# Patient Record
Sex: MALE | Race: WHITE
[De-identification: names, ages, dates, MRNs, and addresses within clinical notes are randomized per-mention and may not be internally consistent; named-entity substitution may affect disease eponyms.]

---

## 2018-09-13 ENCOUNTER — HOSPITAL ENCOUNTER (OUTPATIENT)
Age: 73
End: 2018-09-13
Payer: COMMERCIAL

## 2018-09-13 DIAGNOSIS — Z01.812: ICD-10-CM

## 2018-09-13 DIAGNOSIS — Z01.818: ICD-10-CM

## 2018-09-13 DIAGNOSIS — I10: ICD-10-CM

## 2018-09-13 DIAGNOSIS — M16.11: Primary | ICD-10-CM

## 2018-09-13 LAB
ADD MANUAL DIFF / SLIDE REVIEW: NO
CHLORIDE SERPL-SCNC: 102 MMOL/L (ref 98–107)
CO2 SERPL-SCNC: 32 MMOL/L (ref 22–32)
HEMATOCRIT: 43.9 % (ref 41–53)
HEMOGLOBIN: 15.1 G/DL (ref 13.5–17.5)
HEMOLYSIS: < 15 (ref 0–50)
MCV RBC: 89.9 FL (ref 80–100)
MEAN CORPUSCULAR HEMOGLOBIN: 30.9 PG (ref 26–34)
MEAN CORPUSCULAR HGB CONC: 34.4 % (ref 30–36)
PLATELET COUNT: 291 X10^3/UL (ref 150–400)
POTASSIUM SERPL-SCNC: 4.9 MMOL/L (ref 3.4–5.1)
SODIUM SERPL-SCNC: 143 MMOL/L (ref 137–145)

## 2018-09-13 PROCEDURE — 93005 ELECTROCARDIOGRAM TRACING: CPT

## 2018-09-13 PROCEDURE — 36415 COLL VENOUS BLD VENIPUNCTURE: CPT

## 2018-09-13 PROCEDURE — 80051 ELECTROLYTE PANEL: CPT

## 2018-09-13 PROCEDURE — 85025 COMPLETE CBC W/AUTO DIFF WBC: CPT

## 2018-09-25 ENCOUNTER — HOSPITAL ENCOUNTER (OUTPATIENT)
Age: 73
End: 2018-09-25
Payer: COMMERCIAL

## 2018-09-25 DIAGNOSIS — M18.12: ICD-10-CM

## 2018-09-25 DIAGNOSIS — M25.532: ICD-10-CM

## 2018-09-25 DIAGNOSIS — G89.29: ICD-10-CM

## 2018-09-25 DIAGNOSIS — Z87.81: ICD-10-CM

## 2018-09-25 DIAGNOSIS — M25.512: Primary | ICD-10-CM

## 2018-09-25 DIAGNOSIS — M25.511: ICD-10-CM

## 2018-09-25 DIAGNOSIS — M19.011: ICD-10-CM

## 2018-09-25 PROCEDURE — 93306 TTE W/DOPPLER COMPLETE: CPT

## 2018-09-25 PROCEDURE — 73030 X-RAY EXAM OF SHOULDER: CPT

## 2018-09-25 PROCEDURE — 73110 X-RAY EXAM OF WRIST: CPT

## 2018-09-25 NOTE — DI.RAD.S_ITS
PROCEDURE:  XR SHOULDER LT MIN 2V  
   
INDICATIONS:  CHRONIC LEFT SHOULDER PAIN  
   
TECHNIQUE: 3 views of the shoulder were acquired.    
   
COMPARISON:  None.  
   
FINDINGS:    
   
Bones:  No fractures or dislocations.  No suspicious bony lesions.  Visualized ribs   
appear intact.  There is a mild high riding appearance of the humeral head. Mild   
degenerative glenohumeral narrowing. Mild acromioclavicular degenerative narrowing.  
   
Soft tissues:  No suspicious soft tissue calcifications.    
   
IMPRESSION:  High riding appearance of the humeral head, which can be indicative of   
rotator cuff pathology.  
   
   
   
   
Dictated by: Tika Harrington M.D. on 9/25/2018 at 14:09       
Approved by: Tika Harrington M.D. on 9/25/2018 at 14:10

## 2018-09-25 NOTE — DI.ECHO.S_ITS
"                                    [facility]  
+---------+                        [facility]                        +---------+  
:         :                      [address]                     :         :  
:         :                      [address]                     :         :  
:         :                          [id number]                         :         :  
:         :                       Phone: [phone]                      :         :  
+---------+                        [phone]                        +---------+  
                             Echocardiogram Report  
+-----------------------------------------------------------------------------+  
:Name: KEN CRUZ          Study Date: 2018        Height: 69 in  :  
:[facility] MRN #: [id number]      Exam Location: [facility]            Weight: 197 lb :  
:Account #: [id number]             Gender: Male                  BSA: 2.1 m2    :  
:: 1945                 Age: 73 yrs                   BP: 138/80 mmHg:  
:Reason For Study: New onset atrial fibrillation                              :  
:                                Performed By: Crystal Langford                    :  
+-----------------------------------------------------------------------------+  
Interpretation Summary  
The left ventricle is normal in size.  
The ejection fraction is estimated to be 60-65%.  
   
The right ventricle is mildly dilated.  
The right ventricular systolic function is normal.  
   
There is mild tricuspid regurgitation.  
The right ventricular systolic pressure is estimated at 30 mmHg assuming a  
right atrial pressure of 3 mm Hg.  
   
The ascending aorta is mild-moderately enlarged.  
   
   
Procedure:   A two-dimensional transthoracic echocardiogram with color flow  
and Doppler was performed. The study quality was technically adequate. There  
is no prior echocardiogram noted for this patient. The patient was in normal  
sinus rhythm during the exam.  
Left Ventricle:   The left ventricle is normal in size. There is normal left  
ventricular wall thickness. A false chord is noted (normal variant). The  
ejection fraction is estimated to be 60-65%. There are no focal wall motion  
abnormalities. Assessment of diastolic parameters indicates a relaxation  
abnormality of the left ventricle, consistent with normal filling pressures.  
Right Ventricle:   The right ventricle is mildly dilated. The right  
ventricular systolic function is normal.  
Atria:   The left atrium is borderline dilated. Right atrial size is normal.  
The interatrial septum is intact with no evidence for an atrial septal defect.  
   
Mitral Valve:   There is mild mitral annular calcification. There is trace  
mitral regurgitation.  
Aortic Valve:   The aortic valve is trileaflet. The aortic valve opens well.  
There is no aortic valve stenosis. No aortic regurgitation is present.  
Tricuspid Valve:   The tricuspid valve is normal in structure and function.  
There is mild tricuspid regurgitation. The right ventricular systolic pressure  
is estimated at 30 mmHg assuming a right atrial pressure of 3 mm Hg.  
Pulmonic Valve:   The pulmonic valve is not well seen, but is grossly normal.  
There is a trace or physiologic amount of pulmonic regurgitation.  
Great Vessels:   The aortic root is normal size. The ascending aorta is mild-  
moderately enlarged. The IVC is of normal diameter and collapses greater than  
50% with a sniff. This suggests a low right atrial pressure of 3 mm Hg.  
Pericardium/ Pleura   There is no pericardial effusion. There is no pleural  
effusion.  
   
   
MMode/2D Measurements & Calculations  
LVIDd: 5.1 cm                            LVOT diam: 2.5 cm  
LVIDs: 3.0 cm                            Ao root diam: 3.7 cm  
FS: 41.7 %                               asc Aorta Diam: 4.1 cm  
EPSS: 0.80 cm                            Ao Arch Diam (Prox Trans): 3.6 cm  
IVSd: 1.00 cm  
LVPWd: 1.0 cm  
LV busby. diameter/BSA (
[id number]|2018 08:35:00|2018 14:12:00|DI.RAD.S_ITS|[facility]|Imaging|[id number]|"PROCEDURE:  XR SHOULDER RT MIN 2V

## 2018-09-25 NOTE — DI.RAD.S_ITS
PROCEDURE:  XR WRIST LT MIN 3V  
   
INDICATIONS: LEFT WRIST PAIN SINCE APRIL AFTER HELPING SOMEONE MOVE  
   
TECHNIQUE:  4 views of the wrist were acquired.    
   
COMPARISON:  None.  
   
FINDINGS:    
   
Bones:  No fractures or dislocations.  No suspicious bony lesions.  There is moderate   
first CMC degenerative narrowing. Periventricular osteophytes are present.  
   
Scaphoid view: No visualized fracture.  
   
Soft tissues:  No suspicious soft tissue calcifications.    
   
IMPRESSION: Moderate first CMC degenerative narrowing.  
   
   
   
Dictated by: Tika Harrington M.D. on 9/25/2018 at 14:10       
Approved by: Tika Harrington M.D. on 9/25/2018 at 14:11

## 2018-10-20 ENCOUNTER — HOSPITAL ENCOUNTER (OUTPATIENT)
Age: 73
End: 2018-10-20
Payer: COMMERCIAL

## 2018-10-20 DIAGNOSIS — I48.0: Primary | ICD-10-CM

## 2018-10-20 LAB
ADD MANUAL DIFF / SLIDE REVIEW: NO
BUN SERPL-MCNC: 17 MG/DL (ref 9–20)
CALCIUM SERPL-MCNC: 9.9 MG/DL (ref 8.4–10.2)
CHLORIDE SERPL-SCNC: 101 MMOL/L (ref 98–107)
CO2 SERPL-SCNC: 32 MMOL/L (ref 22–32)
ESTIMATED GLOMERULAR FILT RATE: > 60 ML/MIN (ref 60–?)
GLUCOSE SERPL-MCNC: 106 MG/DL (ref 80–110)
HEMATOCRIT: 41.9 % (ref 41–53)
HEMOGLOBIN: 14.2 G/DL (ref 13.5–17.5)
HEMOLYSIS: < 15 (ref 0–50)
MCV RBC: 89.2 FL (ref 80–100)
MEAN CORPUSCULAR HEMOGLOBIN: 30.2 PG (ref 26–34)
MEAN CORPUSCULAR HGB CONC: 33.8 % (ref 30–36)
PLATELET COUNT: 307 X10^3/UL (ref 150–400)
POTASSIUM SERPL-SCNC: 4.7 MMOL/L (ref 3.4–5.1)
SODIUM SERPL-SCNC: 144 MMOL/L (ref 137–145)
TSH SERPL DL<=0.05 MIU/L-ACNC: 1.87 UIU/ML (ref 0.47–4.68)

## 2018-10-20 PROCEDURE — 84443 ASSAY THYROID STIM HORMONE: CPT

## 2018-10-20 PROCEDURE — 36415 COLL VENOUS BLD VENIPUNCTURE: CPT

## 2018-10-20 PROCEDURE — 85025 COMPLETE CBC W/AUTO DIFF WBC: CPT

## 2018-10-20 PROCEDURE — 80048 BASIC METABOLIC PNL TOTAL CA: CPT

## 2019-06-17 ENCOUNTER — HOSPITAL ENCOUNTER (OUTPATIENT)
Age: 74
End: 2019-06-17
Payer: COMMERCIAL

## 2019-06-17 DIAGNOSIS — Z01.818: Primary | ICD-10-CM

## 2019-06-17 DIAGNOSIS — I48.0: ICD-10-CM

## 2019-06-17 LAB
ADD MANUAL DIFF / SLIDE REVIEW: NO
CHOLEST SERPL-MCNC: 182 MG/DL (ref 140–199)
HDLC SERPL-MCNC: 65 MG/DL (ref 40–60)
HEMATOCRIT: 40.6 % (ref 41–53)
HEMOGLOBIN: 13.6 G/DL (ref 13.5–17.5)
LYMPHOCYTES # SPEC AUTO: 1500 /UL (ref 1100–4500)
MCV RBC: 89.1 FL (ref 80–100)
MEAN CORPUSCULAR HEMOGLOBIN: 29.9 PG (ref 26–34)
MEAN CORPUSCULAR HGB CONC: 33.5 % (ref 30–36)
PLATELET COUNT: 299 X10^3/UL (ref 150–400)
TRIGL SERPL-MCNC: 106 MG/DL (ref 35–150)

## 2019-06-17 PROCEDURE — 36415 COLL VENOUS BLD VENIPUNCTURE: CPT

## 2019-06-17 PROCEDURE — 85025 COMPLETE CBC W/AUTO DIFF WBC: CPT

## 2019-06-17 PROCEDURE — 80061 LIPID PANEL: CPT

## 2019-08-01 ENCOUNTER — HOSPITAL ENCOUNTER (OUTPATIENT)
Age: 74
End: 2019-08-01
Payer: COMMERCIAL

## 2019-08-01 DIAGNOSIS — I47.2: ICD-10-CM

## 2019-08-01 DIAGNOSIS — I48.0: Primary | ICD-10-CM

## 2019-08-01 PROCEDURE — 93016 CV STRESS TEST SUPVJ ONLY: CPT

## 2019-08-01 PROCEDURE — 93017 CV STRESS TEST TRACING ONLY: CPT

## 2019-08-01 PROCEDURE — A9502 TC99M TETROFOSMIN: HCPCS

## 2019-08-01 PROCEDURE — 93018 CV STRESS TEST I&R ONLY: CPT

## 2019-08-01 PROCEDURE — 78452 HT MUSCLE IMAGE SPECT MULT: CPT

## 2019-08-02 ENCOUNTER — HOSPITAL ENCOUNTER (OUTPATIENT)
Age: 74
End: 2019-08-02
Payer: COMMERCIAL

## 2019-08-02 DIAGNOSIS — I47.2: ICD-10-CM

## 2019-08-02 DIAGNOSIS — I77.89: ICD-10-CM

## 2019-08-02 DIAGNOSIS — I48.91: ICD-10-CM

## 2019-08-02 DIAGNOSIS — I07.1: Primary | ICD-10-CM

## 2019-08-02 PROCEDURE — 93306 TTE W/DOPPLER COMPLETE: CPT

## 2019-08-02 NOTE — P.PCN_ITS
Cardiac Stress Test Report    
Referral & Results    
Date Patient Seen: 08/02/19    
Requesting provider: Magda Perry    
Indication: AFib    
Rest ECG: Unremarkable    
Procedure Note: Today following both written and verbal informed consent the   
patient was exercised according to a standard Sergio protocol patient went for a   
total of 5 minutes 55 seconds achieving a maximum heart rate of 155 maximum   
systolic blood pressure of 198.  This is approximately 7.0 METS.  Exercise was   
terminated at this point because of targets were met.    
    
Patient was also given Cardiolite through a previously started Hep-Lock IV by   
the diagnostic imaging staff approximately 1 minute prior to the cessation of   
exercise.    
    
No ST-T segment changes    
    
Occasional PVC later in the exercise portion of the test.    
    
Normal heart rate and blood pressure response to exercise    
    
Functional aerobic impairment rates about 8% on the sedentary scale    
    
    
    
Impression:     
No ECG evidence of ischemia    
    
Occasional PVC during exercise    
    
Please see perfusion imaging report as well    
Please note: Actual ECG tracings can be found in the PACS system.

## 2019-08-02 NOTE — DI.ECHO.S_ITS
"                                    [facility]  
+---------+                        [facility]                        +---------+  
:         :                      [address]                     :         :  
:         :                      [address]                     :         :  
:         :                          [id number]                         :         :  
:         :                       Phone: [phone]                      :         :  
+---------+                        [phone]                        +---------+  
                             Echocardiogram Report  
+-----------------------------------------------------------------------------+  
:Name: KEN CRUZ           Study Date: 2019       Height: 69 in  :  
:[facility] MRN #: [id number]       Exam Location: [facility]           Weight: 198 lb :  
:Account #: [id number]            Gender: Male                 BSA: 2.1 m2    :  
:: 1945                  Age: 74 yrs                  BP: 128/68 mmHg:  
:Reason For Study: VTACH                                                      :  
:                                 Performed By: Rigoberto Serna                      :  
:Referring: SCOTT ORTEGA                                                    :  
+-----------------------------------------------------------------------------+  
Interpretation Summary  
1) Normal left ventricular size, thickness, wall motion, and systolic function  
(EF 55-60%).  
2) Normal right ventricular size and function.  
3) Moderate biatrial enlargement present.  
4) The ascending aorta is mildly enlarged at 4.1cm.  
5) Compared to the Echo done 2018, no significant change.  
   
   
Procedure:   A two-dimensional transthoracic echocardiogram with color flow  
and Doppler was performed. The study quality was technically adequate.  
Comparison is made with the echocardiogram of 18. The patient was in  
normal sinus rhythm during the exam. The patient had occasional PVCs during  
the exam.  
Left Ventricle:   The left ventricle is normal in size. There is normal left  
ventricular wall thickness. The ejection fraction is estimated to be 55-60%.  
Left ventricular systolic function is normal. There are no focal wall motion  
abnormalities.  
Right Ventricle:   The right ventricle is normal in size and function.  
Atria:   The left atrium is moderately dilated. The right atrium is moderately  
dilated. The interatrial septum is intact with no evidence for an atrial  
septal defect.  
Mitral Valve:   The mitral valve is normal in structure and function. There is  
trace mitral regurgitation.  
Aortic Valve:   The aortic valve is trileaflet. The aortic valve opens well.  
There is trace aortic regurgitation.  
Tricuspid Valve:   The tricuspid valve is normal in structure and function.  
There is mild tricuspid regurgitation. The right ventricular systolic pressure  
is estimated to be at least 25 mmHg based on an estimated right atrial  
pressure of 3 mm Hg.  
Pulmonic Valve:   The pulmonic valve is normal in structure and function.  
There is trace pulmonic regurgitation.  
Great Vessels:   The aortic root is normal size. The ascending aorta is mildly  
enlarged. The pulmonary artery is normal size. The IVC is of normal diameter  
and collapses greater than 50% with a sniff. This suggests a low right atrial  
pressure of 3 mm Hg.  
Pericardium/ Pleura   There is no pericardial effusion. There is no pleural  
effusion.  
   
   
MMode/2D Measurements & Calculations  
LVIDd: 5.3 cm                            LVOT diam: 2.3 cm  
LVIDs: 4.1 cm                            Ao root diam: 3.5 cm  
FS: 23.0 %                               Aortic Jxn: 2.7 cm  
EPSS: 0.80 cm                            asc Aorta Diam: 4.1 cm  
IVSd: 0.82 cm                            Ao Arch Diam (Prox Trans): 2.9 cm  
LVPWd: 0.90 cm  
LV busby. diameter/BSA (cm/m^2): 2.6  
LV sys. diameter/BSA (cm/m^2): 2.0  
        ______________________
[id number]|2019 10:24:03|2019 10:24:03|PM.TREADMILL||||"Cardiac Stress Test Report

## 2019-08-03 NOTE — DI.NM.S_ITS
DATE OF SERVICE: 08/01/2019  
   
PROCEDURE: Exercise perfusion study.  
   
INDICATIONS: Paroxysmal atrial fibrillation, nonsustained ventricular  
tachycardia.  
   
RADIOPHARMACEUTICAL: 25.0 mCi technetium-99m Myoview IV was injected at stress  
and 26.5 mCi technetium-99m Myoview IV was injected at rest.  
   
CARDIAC STRESS: The patient underwent an exercise stress test under the  
supervision of an attending staff.  He walked on Sergio protocol for 5 minutes 55  
seconds, achieved 7 METs of workload, 106% of target heartrate, and normal blood  
pressure response.  No significant symptoms were reported.  Baseline EKG  
revealed sinus rhythm.  Stress EKG did not reveal any obvious inducible ischemic  
changes.  There were occasional PVCs.  No obvious atrial fibrillation or  
ventricular tachycardia seen.  
   
RAW DATA: There was increased subdiaphragmatic activity.  
   
GATED STUDY: Stress LV ejection fraction 56% without any obvious wall motion  
abnormalities.  Resting end-diastolic volume 143 mL.  No transient ischemic  
dilatation.  TID ratio 0.96, which is within normal limits.  Lung/heart ratio  
0.33, which is within normal limits.  
   
MYOCARDIAL PERFUSION: Stress supine and resting supine images revealed moderate-  
sized mildly decreased perfusion of the inferior wall, inferior apex, which got  
significantly improved during prone images, suggestive of diaphragmatic tissue  
attenuation artifact.  No convincing ischemia, infarction pattern.  
   
CONCLUSION: I will call this study likely a normal myocardial perfusion study  
with evidence of diaphragmatic tissue attenuation artifact which got improved  
during prone images.  Stress LV ejection fraction was about 56%.  No obvious  
atrial fibrillation or ventricular tachycardia during exercise.  Overall, this  
is a low-risk myocardial perfusion scan.  
   
   
   
Asher Jack - MRN: [id number]  
JANET/k[employer]/ts  
doc#: [id number]/job#:  [id number]  
dd: 08/02/2019 16:33:00 dt: 08/03/2019 06:44:00  
DICTATING MD/COPIES TO: Ricky Flores MD  
COPIES MNE: ELIZABETH

## 2020-07-03 ENCOUNTER — HOSPITAL ENCOUNTER (OUTPATIENT)
Age: 75
End: 2020-07-03
Payer: COMMERCIAL

## 2020-07-03 DIAGNOSIS — Z79.01: ICD-10-CM

## 2020-07-03 DIAGNOSIS — I48.0: Primary | ICD-10-CM

## 2020-07-03 LAB
ADD MANUAL DIFF / SLIDE REVIEW: NO
BUN SERPL-MCNC: 19 MG/DL (ref 9–20)
CALCIUM SERPL-MCNC: 9.8 MG/DL (ref 8.4–10.2)
CHLORIDE SERPL-SCNC: 101 MMOL/L (ref 98–107)
CO2 SERPL-SCNC: 28 MMOL/L (ref 22–32)
ESTIMATED GLOMERULAR FILT RATE: > 60 ML/MIN (ref 60–?)
GLUCOSE SERPL-MCNC: 110 MG/DL (ref 80–110)
HEMATOCRIT: 40.7 % (ref 41–53)
HEMOGLOBIN: 14.1 G/DL (ref 13.5–17.5)
HEMOLYSIS: < 15 (ref 0–50)
LYMPHOCYTES # SPEC AUTO: 1600 /UL (ref 1100–4500)
MCV RBC: 91.8 FL (ref 80–100)
MEAN CORPUSCULAR HEMOGLOBIN: 31.7 PG (ref 26–34)
MEAN CORPUSCULAR HGB CONC: 34.6 % (ref 30–36)
PLATELET COUNT: 267 X10^3/UL (ref 150–400)
POTASSIUM SERPL-SCNC: 4.4 MMOL/L (ref 3.4–5.1)
SODIUM SERPL-SCNC: 135 MMOL/L (ref 137–145)

## 2020-07-03 PROCEDURE — 36415 COLL VENOUS BLD VENIPUNCTURE: CPT

## 2020-07-03 PROCEDURE — 85025 COMPLETE CBC W/AUTO DIFF WBC: CPT

## 2020-07-03 PROCEDURE — 80048 BASIC METABOLIC PNL TOTAL CA: CPT

## 2020-10-09 ENCOUNTER — HOSPITAL ENCOUNTER (OUTPATIENT)
Age: 75
End: 2020-10-09
Payer: COMMERCIAL

## 2020-10-09 DIAGNOSIS — Z11.59: Primary | ICD-10-CM

## 2020-10-09 PROCEDURE — 87635 SARS-COV-2 COVID-19 AMP PRB: CPT

## 2020-10-12 ENCOUNTER — HOSPITAL ENCOUNTER (OUTPATIENT)
Age: 75
Discharge: HOME | End: 2020-10-12
Payer: COMMERCIAL

## 2020-10-12 VITALS
RESPIRATION RATE: 11 BRPM | SYSTOLIC BLOOD PRESSURE: 113 MMHG | TEMPERATURE: 97.34 F | DIASTOLIC BLOOD PRESSURE: 72 MMHG | OXYGEN SATURATION: 94 % | HEART RATE: 58 BPM

## 2020-10-12 VITALS — BODY MASS INDEX: 28.8 KG/M2

## 2020-10-12 VITALS
RESPIRATION RATE: 11 BRPM | OXYGEN SATURATION: 94 % | DIASTOLIC BLOOD PRESSURE: 73 MMHG | SYSTOLIC BLOOD PRESSURE: 116 MMHG | HEART RATE: 61 BPM

## 2020-10-12 VITALS
TEMPERATURE: 98.2 F | DIASTOLIC BLOOD PRESSURE: 84 MMHG | OXYGEN SATURATION: 97 % | SYSTOLIC BLOOD PRESSURE: 132 MMHG | RESPIRATION RATE: 16 BRPM | HEART RATE: 65 BPM

## 2020-10-12 VITALS
HEART RATE: 65 BPM | OXYGEN SATURATION: 96 % | DIASTOLIC BLOOD PRESSURE: 79 MMHG | SYSTOLIC BLOOD PRESSURE: 122 MMHG | RESPIRATION RATE: 13 BRPM

## 2020-10-12 VITALS
RESPIRATION RATE: 17 BRPM | SYSTOLIC BLOOD PRESSURE: 107 MMHG | HEART RATE: 61 BPM | DIASTOLIC BLOOD PRESSURE: 70 MMHG | TEMPERATURE: 98.1 F | OXYGEN SATURATION: 96 %

## 2020-10-12 VITALS
DIASTOLIC BLOOD PRESSURE: 73 MMHG | HEART RATE: 59 BPM | SYSTOLIC BLOOD PRESSURE: 122 MMHG | OXYGEN SATURATION: 95 % | TEMPERATURE: 97.5 F

## 2020-10-12 DIAGNOSIS — Z79.01: ICD-10-CM

## 2020-10-12 DIAGNOSIS — I48.0: ICD-10-CM

## 2020-10-12 DIAGNOSIS — Z86.010: ICD-10-CM

## 2020-10-12 DIAGNOSIS — Z12.11: Primary | ICD-10-CM

## 2020-10-12 PROCEDURE — 0DJD8ZZ INSPECTION OF LOWER INTESTINAL TRACT, VIA NATURAL OR ARTIFICIAL OPENING ENDOSCOPIC: ICD-10-PCS | Performed by: FAMILY MEDICINE

## 2020-10-12 NOTE — PM.HP.1
"History of Present Illness
History of Present Illness
Date Patient Seen: 10/12/20
Chief complaint: SDC
Narrative: 75 Years Old Male seen today for consideration of a screening colonoscopy.  Last colonoscopy 2016, reportedly had tubular adenomas, pathology not available at time dictation.  There have been no lower GI symptoms suggesting disease such 
as change in bowel habits, bleeding, abdominal pain or anemia.  There's been no family history of colon cancer or colon polyps.  Overall health issues have been stable, including no major cardiac events for at least 6 weeks.  He does have paroxysmal 
atrial fibrillation with Dr. Parmer consulting.  On Pradaxa.  Asymptomatic.  Atrial fibrillation found incidentally on preoperative EKG for his hip replacement.


Current Medications:
1) Zolpidem Tartrate 5 Mg Oral Tablet (Zolpidem Tartrate) .... one tablet at bedtime as needed for insomnia
2) Pradaxa 150 Mg Oral Capsule (Dabigatran Etexilate Mesylate) .... Take one by mouth twice a day
3) Metoprolol Succinate Er 100 Mg Oral Tablet Extended Release 24 Hour (Metoprolol Succinate) .... Take one by mouth every day per cardiologist
4) Tylenol 325 Mg Oral Tablet (Acetaminophen) .... One or two by mouth every 4 to 6 hours as needed for pain, fever.
5) Heliocare 240 Mg Oral Capsule (Polypodium Leucotomos) .... Take one by mouth every day

Allergies:
1) Codeine (Critical)

Past Medical History:
Insomnia
Urinary urgency 
Atrial fibrillation, paroxysmal, 13% of time

Past Surgical History:
[place]19[place]
L Hip replacement 8/2017, 
Cholecystectomy (01/01/1992)
Colonoscopy x 2, 2005, 2015, h/o of colon polyps

Family History:
Reviewed history from 09/17/2019 and no changes required:
Father: d age 87, Prostate cancer, deafness
Mother: d age 82, Alzheimers, Heart disease
Siblings: d age 65, Breast cancer

Social History:
Reviewed history from 06/27/2018 and no changes required:
Marital Status: 
Children: 
Occupation:  - Self Employed
Household Members: self
Education: 13

Patient History
Medical History (Updated 10/12/20 @ 14:45 by Selina Lacey RN)

Arthritis (Acute)
History of colon polyps (Acute)
Insomnia (Acute)
Migraines (Acute)
Paroxysmal A-fib (Acute)
Precancerous skin lesion (Acute)
Urinary urgency (Acute)


Surgical History (Updated 10/12/20 @ 14:45 by Selina Lacey RN)

H/O vasectomy (Acute)
History of colonoscopy (Acute)
History of tonsillectomy and adenoidectomy (Acute)
History of total left hip arthroplasty (Acute)
Hx of cholecystectomy (Acute)
Status post right foot surgery (Acute)
Status post total hip replacement, bilateral (Acute)



Meds
Home Medications and Allergies
Home Medications

 Medication  Instructions  Recorded  Confirmed  Type
dabigatran etexilate [Pradaxa] 150 mg PO BID 10/12/20 10/12/20 History
diphenhydramine-acetaminophen 1 tab PO BEDTIME PRN 10/12/20 10/12/20 History
[Tylenol PM Extra Strength]    
metoprolol succinate 100 mg PO BID 10/12/20 10/12/20 History
omeprazole magnesium [Prilosec OTC] 20 mg PO BID 10/12/20 10/12/20 History
polypodium leucotomos extract 240 mg PO DAILY 10/12/20 10/12/20 History
[Heliocare]    
zolpidem 5 mg PO BEDTIME PRN 10/12/20 10/12/20 History


Allergies

Allergy/AdvReac Type Severity Reaction Status Date / Time
codeine AdvReac Severe stomach Verified 10/12/20 14:39
   pain  



Review of Systems
Review of Systems
ROS: Yes All systems reviewed with the patient and are negative except as otherwise documented

Exam
Narrative
Exam Narrative: GENERAL:  Alert and oriented, appearing stated age and in no acute distress.
HEENT:  Head normocephalic/atraumatic.  Pupils equal, round, and reactive to light and accomodation.  Extraocular muscles intact.  Tympanic membranes clear.  Nasal mucosa moist, septum midline.  Oral mucosa moist, no lesions.  Neck soft and supple, 
no lymphadenopathy.
LUNGS:  Clear to ausculation bilaterally, no wheezes, rhonchi or ral
[id number]|2020-10-12 12:39:47|2020-10-12 12:39:47|PM.OP.ENDO||||"Operative Date/Time/Diagnoses

## 2020-10-12 NOTE — P.HP_ITS
"History of Present Illness    
History of Present Illness    
Date Patient Seen: 10/12/20    
Chief complaint: SDC    
Narrative: 75 Years Old Male seen today for consideration of a screening   
colonoscopy.  Last colonoscopy 2016, reportedly had tubular adenomas, pathology   
not available at time dictation.  There have been no lower GI symptoms   
suggesting disease such as change in bowel habits, bleeding, abdominal pain or   
anemia.  There's been no family history of colon cancer or colon polyps.    
Overall health issues have been stable, including no major cardiac events for at  
least 6 weeks.  He does have paroxysmal atrial fibrillation with Dr. Parmer   
consulting.  On Pradaxa.  Asymptomatic.  Atrial fibrillation found incidentally   
on preoperative EKG for his hip replacement.    
    
    
Current Medications:    
1) Zolpidem Tartrate 5 Mg Oral Tablet (Zolpidem Tartrate) .... one tablet at   
bedtime as needed for insomnia    
2) Pradaxa 150 Mg Oral Capsule (Dabigatran Etexilate Mesylate) .... Take one by   
mouth twice a day    
3) Metoprolol Succinate Er 100 Mg Oral Tablet Extended Release 24 Hour   
(Metoprolol Succinate) .... Take one by mouth every day per cardiologist    
4) Tylenol 325 Mg Oral Tablet (Acetaminophen) .... One or two by mouth every 4   
to 6 hours as needed for pain, fever.    
5) Heliocare 240 Mg Oral Capsule (Polypodium Leucotomos) .... Take one by mouth   
every day    
    
Allergies:    
1) Codeine (Critical)    
    
Past Medical History:    
Insomnia    
Urinary urgency     
Atrial fibrillation, paroxysmal, 13% of time    
    
Past Surgical History:    
[place]    
L Hip replacement 8/2017,     
Cholecystectomy (01/01/1992)    
Colonoscopy x 2, 2005, 2015, h/o of colon polyps    
    
Family History:    
Reviewed history from 09/17/2019 and no changes required:    
Father: d age 87, Prostate cancer, deafness    
Mother: d age 82, Alzheimers, Heart disease    
Siblings: d age 65, Breast cancer    
    
Social History:    
Reviewed history from 06/27/2018 and no changes required:    
Marital Status:     
Children:     
Occupation:  - Self Employed    
Household Members: self    
Education: 13    
    
Patient History    
Medical History (Updated 10/12/20 @ 14:45 by Selina Lacey RN)    
    
Arthritis (Acute)    
History of colon polyps (Acute)    
Insomnia (Acute)    
Migraines (Acute)    
Paroxysmal A-fib (Acute)    
Precancerous skin lesion (Acute)    
Urinary urgency (Acute)    
    
    
Surgical History (Updated 10/12/20 @ 14:45 by Selina Lacey RN)    
    
H/O vasectomy (Acute)    
History of colonoscopy (Acute)    
History of tonsillectomy and adenoidectomy (Acute)    
History of total left hip arthroplasty (Acute)    
Hx of cholecystectomy (Acute)    
Status post right foot surgery (Acute)    
Status post total hip replacement, bilateral (Acute)    
    
    
    
Meds    
Home Medications and Allergies    
                                Home Medications    
    
    
    
 Medication  Instructions  Recorded  Confirmed  Type    
     
dabigatran etexilate [Pradaxa] 150 mg PO BID 10/12/20 10/12/20 History    
     
diphenhydramine-acetaminophen 1 tab PO BEDTIME PRN 10/12/20 10/12/20 History    
    
[Tylenol PM Extra Strength]        
     
metoprolol succinate 100 mg PO BID 10/12/20 10/12/20 History    
     
omeprazole magnesium [Prilosec OTC] 20 mg PO BID 10/12/20 10/12/20 History    
     
polypodium leucotomos extract 240 mg PO DAILY 10/12/20 10/12/20 History    
    
[Heliocare]        
     
zolpidem 5 mg PO BEDTIME PRN 10/12/20 10/12/20 History    
    
    
    
                                    Allergies    
    
    
    
Allergy/AdvReac Type Severity Reaction Status Date / Time    
     
codeine AdvReac Severe stomach Verified 10/12/20 14:39    
    
   pain      
    
    
    
    
Review of Sys
[id number]|2020-10-12 12:39:00|2020-10-12 12:39:00|P.OP.ENDO_ITS|[facility]|Health Information Management|[id number]|"Operative Date/Time/Diagnoses

## 2021-06-15 ENCOUNTER — HOSPITAL ENCOUNTER (OUTPATIENT)
Age: 76
End: 2021-06-15
Payer: COMMERCIAL

## 2021-06-15 DIAGNOSIS — R05: Primary | ICD-10-CM

## 2021-06-15 DIAGNOSIS — R09.89: ICD-10-CM

## 2021-06-15 PROCEDURE — 71046 X-RAY EXAM CHEST 2 VIEWS: CPT

## 2021-07-28 ENCOUNTER — HOSPITAL ENCOUNTER (OUTPATIENT)
Age: 76
End: 2021-07-28
Payer: COMMERCIAL

## 2021-07-28 DIAGNOSIS — R05: Primary | ICD-10-CM

## 2021-07-28 DIAGNOSIS — Z20.822: ICD-10-CM

## 2021-07-28 DIAGNOSIS — J98.8: ICD-10-CM

## 2021-07-28 DIAGNOSIS — Z20.822: Primary | ICD-10-CM

## 2021-07-28 PROCEDURE — 87635 SARS-COV-2 COVID-19 AMP PRB: CPT

## 2021-07-28 PROCEDURE — 94729 DIFFUSING CAPACITY: CPT

## 2021-07-28 PROCEDURE — 94726 PLETHYSMOGRAPHY LUNG VOLUMES: CPT

## 2021-07-28 PROCEDURE — 94060 EVALUATION OF WHEEZING: CPT

## 2021-07-30 ENCOUNTER — HOSPITAL ENCOUNTER (OUTPATIENT)
Dept: HOSPITAL 73 - ED | Age: 76
Setting detail: OBSERVATION
LOS: 2 days | Discharge: NURSING, REHAB OR LONG-TERM CARE | End: 2021-08-01
Payer: MEDICARE

## 2021-07-30 VITALS — BODY MASS INDEX: 27.3 KG/M2 | BODY MASS INDEX: 28 KG/M2

## 2021-07-30 VITALS — OXYGEN SATURATION: 95 % | HEART RATE: 53 BPM | RESPIRATION RATE: 22 BRPM

## 2021-07-30 VITALS
OXYGEN SATURATION: 96 % | SYSTOLIC BLOOD PRESSURE: 159 MMHG | RESPIRATION RATE: 18 BRPM | HEART RATE: 60 BPM | DIASTOLIC BLOOD PRESSURE: 72 MMHG

## 2021-07-30 VITALS
HEART RATE: 52 BPM | DIASTOLIC BLOOD PRESSURE: 61 MMHG | RESPIRATION RATE: 12 BRPM | SYSTOLIC BLOOD PRESSURE: 140 MMHG | OXYGEN SATURATION: 96 %

## 2021-07-30 VITALS
OXYGEN SATURATION: 97 % | DIASTOLIC BLOOD PRESSURE: 55 MMHG | SYSTOLIC BLOOD PRESSURE: 99 MMHG | TEMPERATURE: 97.4 F | HEART RATE: 51 BPM | RESPIRATION RATE: 97 BRPM

## 2021-07-30 VITALS
SYSTOLIC BLOOD PRESSURE: 139 MMHG | OXYGEN SATURATION: 96 % | HEART RATE: 60 BPM | DIASTOLIC BLOOD PRESSURE: 61 MMHG | RESPIRATION RATE: 20 BRPM

## 2021-07-30 VITALS
RESPIRATION RATE: 18 BRPM | TEMPERATURE: 98.24 F | OXYGEN SATURATION: 97 % | SYSTOLIC BLOOD PRESSURE: 140 MMHG | HEART RATE: 55 BPM | DIASTOLIC BLOOD PRESSURE: 77 MMHG

## 2021-07-30 VITALS
SYSTOLIC BLOOD PRESSURE: 119 MMHG | HEART RATE: 46 BPM | DIASTOLIC BLOOD PRESSURE: 69 MMHG | RESPIRATION RATE: 15 BRPM | OXYGEN SATURATION: 96 %

## 2021-07-30 VITALS
HEART RATE: 58 BPM | SYSTOLIC BLOOD PRESSURE: 137 MMHG | RESPIRATION RATE: 12 BRPM | DIASTOLIC BLOOD PRESSURE: 62 MMHG | OXYGEN SATURATION: 95 %

## 2021-07-30 VITALS — OXYGEN SATURATION: 96 % | HEART RATE: 59 BPM | RESPIRATION RATE: 15 BRPM

## 2021-07-30 VITALS
DIASTOLIC BLOOD PRESSURE: 65 MMHG | HEART RATE: 63 BPM | OXYGEN SATURATION: 95 % | RESPIRATION RATE: 14 BRPM | SYSTOLIC BLOOD PRESSURE: 140 MMHG

## 2021-07-30 VITALS
SYSTOLIC BLOOD PRESSURE: 127 MMHG | RESPIRATION RATE: 16 BRPM | HEART RATE: 69 BPM | DIASTOLIC BLOOD PRESSURE: 64 MMHG | OXYGEN SATURATION: 97 %

## 2021-07-30 VITALS — RESPIRATION RATE: 13 BRPM | HEART RATE: 49 BPM | OXYGEN SATURATION: 96 %

## 2021-07-30 VITALS — OXYGEN SATURATION: 97 %

## 2021-07-30 VITALS — OXYGEN SATURATION: 96 % | RESPIRATION RATE: 16 BRPM | HEART RATE: 59 BPM

## 2021-07-30 VITALS
HEART RATE: 55 BPM | OXYGEN SATURATION: 97 % | DIASTOLIC BLOOD PRESSURE: 71 MMHG | SYSTOLIC BLOOD PRESSURE: 155 MMHG | RESPIRATION RATE: 16 BRPM

## 2021-07-30 VITALS — OXYGEN SATURATION: 96 % | HEART RATE: 50 BPM | RESPIRATION RATE: 20 BRPM

## 2021-07-30 VITALS — OXYGEN SATURATION: 95 % | RESPIRATION RATE: 17 BRPM | HEART RATE: 51 BPM

## 2021-07-30 VITALS — DIASTOLIC BLOOD PRESSURE: 69 MMHG | SYSTOLIC BLOOD PRESSURE: 129 MMHG

## 2021-07-30 DIAGNOSIS — I48.0: ICD-10-CM
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
## 2021-07-30 DIAGNOSIS — Z20.822: ICD-10-CM
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
## 2021-07-30 DIAGNOSIS — S32.512A: Primary | ICD-10-CM

## 2021-07-30 DIAGNOSIS — G47.00: ICD-10-CM

## 2021-07-30 DIAGNOSIS — K21.9: ICD-10-CM

## 2021-07-30 LAB
ADD MANUAL DIFF / SLIDE REVIEW: NO
ALBUMIN SERPL-MCNC: 4.3 G/DL (ref 3.5–5)
ALBUMIN/GLOB SERPL: 1.3 {RATIO} (ref 1–2.8)
ALP SERPL-CCNC: 37 U/L (ref 38–126)
ALT SERPL-CCNC: 27 IU/L (ref ?–50)
BUN SERPL-MCNC: 20 MG/DL (ref 9–20)
CALCIUM SERPL-MCNC: 9.7 MG/DL (ref 8.4–10.2)
CHLORIDE SERPL-SCNC: 104 MMOL/L (ref 98–107)
CO2 SERPL-SCNC: 26 MMOL/L (ref 22–32)
ESTIMATED GLOMERULAR FILT RATE: > 60 ML/MIN (ref 60–?)
ETHANOL SERPL-MCNC: < 10 MG/DL
GLOBULIN SER CALC-MCNC: 3.4 G/DL (ref 1.7–4.1)
GLUCOSE SERPL-MCNC: 182 MG/DL (ref 80–110)
HEMATOCRIT: 40 % (ref 41–53)
HEMOGLOBIN: 13.2 G/DL (ref 13.5–17.5)
HEMOLYSIS: < 15 (ref 0–50)
INR PPP: 1.2 (ref 0.9–1.3)
LIPASE SERPL-CCNC: 114 U/L (ref 23–300)
LYMPHOCYTES # SPEC AUTO: 2000 /UL (ref 1100–4500)
MAGNESIUM SERPL-MCNC: 2.1 MG/DL (ref 1.6–2.3)
MCV RBC: 90.6 FL (ref 80–100)
MEAN CORPUSCULAR HEMOGLOBIN: 29.9 PG (ref 26–34)
MEAN CORPUSCULAR HGB CONC: 33 % (ref 30–36)
PLATELET COUNT: 299 X10^3/UL (ref 150–400)
POTASSIUM SERPL-SCNC: 4.2 MMOL/L (ref 3.4–5.1)
PROT SERPL-MCNC: 7.7 G/DL (ref 6.3–8.2)
PROTHROMBIN TIME: 13.1 SECONDS (ref 10.1–12.7)
SODIUM SERPL-SCNC: 137 MMOL/L (ref 137–145)

## 2021-07-30 PROCEDURE — 87635 SARS-COV-2 COVID-19 AMP PRB: CPT

## 2021-07-30 PROCEDURE — 81003 URINALYSIS AUTO W/O SCOPE: CPT

## 2021-07-30 PROCEDURE — 87086 URINE CULTURE/COLONY COUNT: CPT

## 2021-07-30 PROCEDURE — 99284 EMERGENCY DEPT VISIT MOD MDM: CPT

## 2021-07-30 PROCEDURE — 96361 HYDRATE IV INFUSION ADD-ON: CPT

## 2021-07-30 PROCEDURE — 71101 X-RAY EXAM UNILAT RIBS/CHEST: CPT

## 2021-07-30 PROCEDURE — 85610 PROTHROMBIN TIME: CPT

## 2021-07-30 PROCEDURE — 70450 CT HEAD/BRAIN W/O DYE: CPT

## 2021-07-30 PROCEDURE — 80053 COMPREHEN METABOLIC PANEL: CPT

## 2021-07-30 PROCEDURE — 73502 X-RAY EXAM HIP UNI 2-3 VIEWS: CPT

## 2021-07-30 PROCEDURE — 80048 BASIC METABOLIC PNL TOTAL CA: CPT

## 2021-07-30 PROCEDURE — 97530 THERAPEUTIC ACTIVITIES: CPT

## 2021-07-30 PROCEDURE — 97161 PT EVAL LOW COMPLEX 20 MIN: CPT

## 2021-07-30 PROCEDURE — 72192 CT PELVIS W/O DYE: CPT

## 2021-07-30 PROCEDURE — 36415 COLL VENOUS BLD VENIPUNCTURE: CPT

## 2021-07-30 PROCEDURE — 96376 TX/PRO/DX INJ SAME DRUG ADON: CPT

## 2021-07-30 PROCEDURE — 85025 COMPLETE CBC W/AUTO DIFF WBC: CPT

## 2021-07-30 PROCEDURE — 80320 DRUG SCREEN QUANTALCOHOLS: CPT

## 2021-07-30 PROCEDURE — 93005 ELECTROCARDIOGRAM TRACING: CPT

## 2021-07-30 PROCEDURE — 81015 MICROSCOPIC EXAM OF URINE: CPT

## 2021-07-30 PROCEDURE — 97116 GAIT TRAINING THERAPY: CPT

## 2021-07-30 PROCEDURE — 83735 ASSAY OF MAGNESIUM: CPT

## 2021-07-30 PROCEDURE — 96375 TX/PRO/DX INJ NEW DRUG ADDON: CPT

## 2021-07-30 PROCEDURE — 97166 OT EVAL MOD COMPLEX 45 MIN: CPT

## 2021-07-30 PROCEDURE — 96374 THER/PROPH/DIAG INJ IV PUSH: CPT

## 2021-07-30 PROCEDURE — G0378 HOSPITAL OBSERVATION PER HR: HCPCS

## 2021-07-30 PROCEDURE — 83690 ASSAY OF LIPASE: CPT

## 2021-07-30 RX ADMIN — FENTANYL CITRATE 50 MCG: 50 INJECTION, SOLUTION INTRAMUSCULAR; INTRAVENOUS at 18:51

## 2021-07-30 RX ADMIN — FENTANYL CITRATE 50 MCG: 50 INJECTION, SOLUTION INTRAMUSCULAR; INTRAVENOUS at 21:12

## 2021-07-30 RX ADMIN — SODIUM CHLORIDE, SODIUM LACTATE, POTASSIUM CHLORIDE, AND CALCIUM CHLORIDE 100 ML: .6; .31; .03; .02 INJECTION, SOLUTION INTRAVENOUS at 23:32

## 2021-07-31 VITALS
RESPIRATION RATE: 18 BRPM | TEMPERATURE: 97.7 F | OXYGEN SATURATION: 96 % | HEART RATE: 53 BPM | SYSTOLIC BLOOD PRESSURE: 104 MMHG | DIASTOLIC BLOOD PRESSURE: 50 MMHG

## 2021-07-31 VITALS
OXYGEN SATURATION: 97 % | SYSTOLIC BLOOD PRESSURE: 121 MMHG | HEART RATE: 51 BPM | TEMPERATURE: 97.6 F | RESPIRATION RATE: 18 BRPM | DIASTOLIC BLOOD PRESSURE: 71 MMHG

## 2021-07-31 VITALS
RESPIRATION RATE: 16 BRPM | HEART RATE: 54 BPM | TEMPERATURE: 98.42 F | SYSTOLIC BLOOD PRESSURE: 129 MMHG | OXYGEN SATURATION: 97 % | DIASTOLIC BLOOD PRESSURE: 74 MMHG

## 2021-07-31 VITALS
OXYGEN SATURATION: 97 % | TEMPERATURE: 97.16 F | DIASTOLIC BLOOD PRESSURE: 65 MMHG | RESPIRATION RATE: 17 BRPM | SYSTOLIC BLOOD PRESSURE: 127 MMHG | HEART RATE: 44 BPM

## 2021-07-31 VITALS
HEART RATE: 56 BPM | SYSTOLIC BLOOD PRESSURE: 146 MMHG | TEMPERATURE: 97.6 F | DIASTOLIC BLOOD PRESSURE: 67 MMHG | OXYGEN SATURATION: 96 % | RESPIRATION RATE: 16 BRPM

## 2021-07-31 VITALS — OXYGEN SATURATION: 97 %

## 2021-07-31 VITALS
OXYGEN SATURATION: 98 % | SYSTOLIC BLOOD PRESSURE: 143 MMHG | RESPIRATION RATE: 12 BRPM | DIASTOLIC BLOOD PRESSURE: 82 MMHG | HEART RATE: 49 BPM | TEMPERATURE: 98.06 F

## 2021-07-31 VITALS
SYSTOLIC BLOOD PRESSURE: 140 MMHG | DIASTOLIC BLOOD PRESSURE: 75 MMHG | OXYGEN SATURATION: 97 % | HEART RATE: 52 BPM | TEMPERATURE: 98.1 F | RESPIRATION RATE: 12 BRPM

## 2021-07-31 VITALS — OXYGEN SATURATION: 98 %

## 2021-07-31 VITALS — OXYGEN SATURATION: 96 %

## 2021-07-31 LAB
BUN SERPL-MCNC: 14 MG/DL (ref 9–20)
CALCIUM SERPL-MCNC: 9.3 MG/DL (ref 8.4–10.2)
CHLORIDE SERPL-SCNC: 103 MMOL/L (ref 98–107)
CO2 SERPL-SCNC: 31 MMOL/L (ref 22–32)
ESTIMATED GLOMERULAR FILT RATE: > 60 ML/MIN (ref 60–?)
GLUCOSE SERPL-MCNC: 103 MG/DL (ref 80–110)
HEMOLYSIS: < 15 (ref 0–50)
INR PPP: 1.2 (ref 0.9–1.3)
POTASSIUM SERPL-SCNC: 4.7 MMOL/L (ref 3.4–5.1)
PROTHROMBIN TIME: 13.3 SECONDS (ref 10.1–12.7)
SODIUM SERPL-SCNC: 137 MMOL/L (ref 137–145)

## 2021-07-31 RX ADMIN — SENNOSIDES 17.2 MG: 8.6 TABLET, COATED ORAL at 21:57

## 2021-07-31 RX ADMIN — OXYCODONE HYDROCHLORIDE 5 MG: 5 TABLET ORAL at 19:55

## 2021-07-31 RX ADMIN — OXYCODONE HYDROCHLORIDE 5 MG: 5 TABLET ORAL at 12:41

## 2021-07-31 RX ADMIN — OXYCODONE HYDROCHLORIDE 5 MG: 5 TABLET ORAL at 22:59

## 2021-07-31 RX ADMIN — Medication 10 ML: at 22:05

## 2021-07-31 RX ADMIN — DABIGATRAN ETEXILATE MESYLATE 150 MG: 75 CAPSULE ORAL at 09:51

## 2021-07-31 RX ADMIN — OXYCODONE HYDROCHLORIDE 5 MG: 5 TABLET ORAL at 16:28

## 2021-07-31 RX ADMIN — PANTOPRAZOLE SODIUM 20 MG: 20 TABLET, DELAYED RELEASE ORAL at 09:44

## 2021-07-31 RX ADMIN — DABIGATRAN ETEXILATE MESYLATE 150 MG: 75 CAPSULE ORAL at 21:58

## 2021-07-31 RX ADMIN — PANTOPRAZOLE SODIUM 20 MG: 20 TABLET, DELAYED RELEASE ORAL at 21:57

## 2021-07-31 RX ADMIN — DOCUSATE SODIUM 100 MG: 100 CAPSULE, LIQUID FILLED ORAL at 09:44

## 2021-07-31 RX ADMIN — KETOROLAC TROMETHAMINE 30 MG: 30 INJECTION, SOLUTION INTRAMUSCULAR at 21:54

## 2021-07-31 RX ADMIN — Medication 10 ML: at 09:47

## 2021-07-31 RX ADMIN — METOPROLOL TARTRATE 25 MG: 50 TABLET, FILM COATED ORAL at 09:44

## 2021-07-31 RX ADMIN — METOPROLOL TARTRATE 12.5 MG: 50 TABLET, FILM COATED ORAL at 21:55

## 2021-07-31 RX ADMIN — DICLOFENAC SODIUM 1 APPLIC: 10 GEL TOPICAL at 16:28

## 2021-07-31 RX ADMIN — DOCUSATE SODIUM 100 MG: 100 CAPSULE, LIQUID FILLED ORAL at 21:57

## 2021-07-31 RX ADMIN — SODIUM CHLORIDE, SODIUM LACTATE, POTASSIUM CHLORIDE, AND CALCIUM CHLORIDE 100 ML: .6; .31; .03; .02 INJECTION, SOLUTION INTRAVENOUS at 09:47

## 2021-07-31 RX ADMIN — HYDROMORPHONE HYDROCHLORIDE 0.5 MG: 1 INJECTION, SOLUTION INTRAMUSCULAR; INTRAVENOUS; SUBCUTANEOUS at 06:44

## 2021-07-31 RX ADMIN — Medication 650 MG: at 09:49

## 2021-08-01 VITALS
HEART RATE: 50 BPM | TEMPERATURE: 97.8 F | SYSTOLIC BLOOD PRESSURE: 125 MMHG | RESPIRATION RATE: 16 BRPM | OXYGEN SATURATION: 96 % | DIASTOLIC BLOOD PRESSURE: 70 MMHG

## 2021-08-01 VITALS
OXYGEN SATURATION: 99 % | RESPIRATION RATE: 16 BRPM | SYSTOLIC BLOOD PRESSURE: 132 MMHG | TEMPERATURE: 97.6 F | HEART RATE: 50 BPM | DIASTOLIC BLOOD PRESSURE: 70 MMHG

## 2021-08-01 VITALS — OXYGEN SATURATION: 97 %

## 2021-08-01 VITALS — OXYGEN SATURATION: 99 %

## 2021-08-01 LAB
INR PPP: 1.2 (ref 0.9–1.3)
PROTHROMBIN TIME: 13.8 SECONDS (ref 10.1–12.7)

## 2021-08-01 RX ADMIN — DOCUSATE SODIUM 100 MG: 100 CAPSULE, LIQUID FILLED ORAL at 08:16

## 2021-08-01 RX ADMIN — DICLOFENAC SODIUM 1 APPLIC: 10 GEL TOPICAL at 08:15

## 2021-08-01 RX ADMIN — PANTOPRAZOLE SODIUM 20 MG: 20 TABLET, DELAYED RELEASE ORAL at 08:15

## 2021-08-01 RX ADMIN — DABIGATRAN ETEXILATE MESYLATE 150 MG: 75 CAPSULE ORAL at 08:15

## 2021-08-01 RX ADMIN — FLECAINIDE ACETATE 50 MG: 100 TABLET ORAL at 03:18

## 2021-08-01 RX ADMIN — OXYCODONE HYDROCHLORIDE 5 MG: 5 TABLET ORAL at 03:20

## 2021-08-01 RX ADMIN — OXYCODONE HYDROCHLORIDE 5 MG: 5 TABLET ORAL at 12:00

## 2021-08-01 RX ADMIN — OXYCODONE HYDROCHLORIDE 5 MG: 5 TABLET ORAL at 08:44

## 2021-09-15 ENCOUNTER — HOSPITAL ENCOUNTER (OUTPATIENT)
Age: 76
End: 2021-09-15
Payer: MEDICARE

## 2021-09-15 VITALS — BODY MASS INDEX: 27.3 KG/M2

## 2021-09-15 DIAGNOSIS — R53.83: ICD-10-CM

## 2021-09-15 DIAGNOSIS — Z20.822: Primary | ICD-10-CM

## 2021-09-15 DIAGNOSIS — J02.9: ICD-10-CM

## 2021-09-15 PROCEDURE — 87635 SARS-COV-2 COVID-19 AMP PRB: CPT

## 2022-06-07 ENCOUNTER — HOSPITAL ENCOUNTER (OUTPATIENT)
Age: 77
End: 2022-06-07
Payer: COMMERCIAL

## 2022-06-07 VITALS — BODY MASS INDEX: 27.3 KG/M2

## 2022-06-07 DIAGNOSIS — J02.9: Primary | ICD-10-CM

## 2022-06-07 PROCEDURE — 87070 CULTURE OTHR SPECIMN AEROBIC: CPT

## 2023-07-06 ENCOUNTER — HOSPITAL ENCOUNTER (EMERGENCY)
Age: 78
Discharge: HOME | End: 2023-07-06
Payer: COMMERCIAL

## 2023-07-06 VITALS
SYSTOLIC BLOOD PRESSURE: 132 MMHG | HEART RATE: 98 BPM | OXYGEN SATURATION: 97 % | RESPIRATION RATE: 16 BRPM | DIASTOLIC BLOOD PRESSURE: 78 MMHG | TEMPERATURE: 98.6 F

## 2023-07-06 VITALS
SYSTOLIC BLOOD PRESSURE: 99 MMHG | DIASTOLIC BLOOD PRESSURE: 70 MMHG | RESPIRATION RATE: 23 BRPM | OXYGEN SATURATION: 98 % | HEART RATE: 79 BPM

## 2023-07-06 VITALS
DIASTOLIC BLOOD PRESSURE: 76 MMHG | SYSTOLIC BLOOD PRESSURE: 118 MMHG | HEART RATE: 78 BPM | RESPIRATION RATE: 19 BRPM | OXYGEN SATURATION: 96 %

## 2023-07-06 VITALS
OXYGEN SATURATION: 98 % | DIASTOLIC BLOOD PRESSURE: 97 MMHG | SYSTOLIC BLOOD PRESSURE: 159 MMHG | HEART RATE: 95 BPM | RESPIRATION RATE: 23 BRPM

## 2023-07-06 VITALS
RESPIRATION RATE: 16 BRPM | OXYGEN SATURATION: 97 % | DIASTOLIC BLOOD PRESSURE: 96 MMHG | HEART RATE: 92 BPM | SYSTOLIC BLOOD PRESSURE: 147 MMHG

## 2023-07-06 VITALS
HEART RATE: 80 BPM | RESPIRATION RATE: 2 BRPM | OXYGEN SATURATION: 96 % | SYSTOLIC BLOOD PRESSURE: 106 MMHG | DIASTOLIC BLOOD PRESSURE: 57 MMHG

## 2023-07-06 VITALS
SYSTOLIC BLOOD PRESSURE: 99 MMHG | RESPIRATION RATE: 13 BRPM | DIASTOLIC BLOOD PRESSURE: 70 MMHG | OXYGEN SATURATION: 96 % | HEART RATE: 77 BPM

## 2023-07-06 VITALS
DIASTOLIC BLOOD PRESSURE: 87 MMHG | SYSTOLIC BLOOD PRESSURE: 131 MMHG | OXYGEN SATURATION: 97 % | RESPIRATION RATE: 22 BRPM | HEART RATE: 78 BPM

## 2023-07-06 VITALS
DIASTOLIC BLOOD PRESSURE: 112 MMHG | RESPIRATION RATE: 18 BRPM | OXYGEN SATURATION: 98 % | HEART RATE: 97 BPM | SYSTOLIC BLOOD PRESSURE: 154 MMHG

## 2023-07-06 VITALS — RESPIRATION RATE: 22 BRPM | OXYGEN SATURATION: 97 % | HEART RATE: 80 BPM

## 2023-07-06 VITALS — DIASTOLIC BLOOD PRESSURE: 82 MMHG | OXYGEN SATURATION: 97 % | SYSTOLIC BLOOD PRESSURE: 138 MMHG | HEART RATE: 107 BPM

## 2023-07-06 VITALS
RESPIRATION RATE: 16 BRPM | OXYGEN SATURATION: 97 % | SYSTOLIC BLOOD PRESSURE: 146 MMHG | DIASTOLIC BLOOD PRESSURE: 93 MMHG | HEART RATE: 94 BPM

## 2023-07-06 VITALS
HEART RATE: 81 BPM | OXYGEN SATURATION: 98 % | SYSTOLIC BLOOD PRESSURE: 118 MMHG | DIASTOLIC BLOOD PRESSURE: 76 MMHG | RESPIRATION RATE: 16 BRPM

## 2023-07-06 VITALS
SYSTOLIC BLOOD PRESSURE: 106 MMHG | DIASTOLIC BLOOD PRESSURE: 57 MMHG | RESPIRATION RATE: 17 BRPM | HEART RATE: 80 BPM | OXYGEN SATURATION: 97 %

## 2023-07-06 VITALS — BODY MASS INDEX: 27.3 KG/M2 | BODY MASS INDEX: 28.6 KG/M2

## 2023-07-06 DIAGNOSIS — D47.2: ICD-10-CM

## 2023-07-06 DIAGNOSIS — C88.0: ICD-10-CM

## 2023-07-06 DIAGNOSIS — I48.91: Primary | ICD-10-CM

## 2023-07-06 DIAGNOSIS — Z79.01: ICD-10-CM

## 2023-07-06 PROCEDURE — 84155 ASSAY OF PROTEIN SERUM: CPT

## 2023-07-06 PROCEDURE — 99284 EMERGENCY DEPT VISIT MOD MDM: CPT

## 2023-07-06 PROCEDURE — 83615 LACTATE (LD) (LDH) ENZYME: CPT

## 2023-07-06 PROCEDURE — 99152 MOD SED SAME PHYS/QHP 5/>YRS: CPT

## 2023-07-06 PROCEDURE — 82232 ASSAY OF BETA-2 PROTEIN: CPT

## 2023-07-06 PROCEDURE — 80053 COMPREHEN METABOLIC PANEL: CPT

## 2023-07-06 PROCEDURE — 93005 ELECTROCARDIOGRAM TRACING: CPT

## 2023-07-06 PROCEDURE — 83883 ASSAY NEPHELOMETRY NOT SPEC: CPT

## 2023-07-06 PROCEDURE — 86334 IMMUNOFIX E-PHORESIS SERUM: CPT

## 2023-07-06 PROCEDURE — 92960 CARDIOVERSION ELECTRIC EXT: CPT

## 2023-07-06 PROCEDURE — 93010 ELECTROCARDIOGRAM REPORT: CPT

## 2023-07-06 PROCEDURE — 36415 COLL VENOUS BLD VENIPUNCTURE: CPT

## 2023-07-06 PROCEDURE — 84165 PROTEIN E-PHORESIS SERUM: CPT

## 2023-07-06 PROCEDURE — 85025 COMPLETE CBC W/AUTO DIFF WBC: CPT

## 2023-07-06 PROCEDURE — 99214 OFFICE O/P EST MOD 30 MIN: CPT

## 2023-07-06 PROCEDURE — 82784 ASSAY IGA/IGD/IGG/IGM EACH: CPT

## 2023-07-06 PROCEDURE — 71045 X-RAY EXAM CHEST 1 VIEW: CPT

## 2023-10-19 ENCOUNTER — HOSPITAL ENCOUNTER (OUTPATIENT)
Age: 78
End: 2023-10-19
Payer: COMMERCIAL

## 2023-10-19 VITALS — BODY MASS INDEX: 27.3 KG/M2

## 2023-10-19 DIAGNOSIS — I48.0: Primary | ICD-10-CM

## 2023-10-19 PROCEDURE — 93306 TTE W/DOPPLER COMPLETE: CPT
